# Patient Record
Sex: FEMALE | URBAN - METROPOLITAN AREA
[De-identification: names, ages, dates, MRNs, and addresses within clinical notes are randomized per-mention and may not be internally consistent; named-entity substitution may affect disease eponyms.]

---

## 2020-08-05 ENCOUNTER — WEB ENCOUNTER (OUTPATIENT)
Dept: URBAN - METROPOLITAN AREA CLINIC 23 | Facility: CLINIC | Age: 40
End: 2020-08-05

## 2020-08-07 ENCOUNTER — LAB OUTSIDE AN ENCOUNTER (OUTPATIENT)
Dept: URBAN - METROPOLITAN AREA CLINIC 23 | Facility: CLINIC | Age: 40
End: 2020-08-07

## 2020-08-18 ENCOUNTER — OFFICE VISIT (OUTPATIENT)
Dept: URBAN - METROPOLITAN AREA SURGERY CENTER 15 | Facility: SURGERY CENTER | Age: 40
End: 2020-08-18
Payer: COMMERCIAL

## 2020-08-18 ENCOUNTER — CLAIMS CREATED FROM THE CLAIM WINDOW (OUTPATIENT)
Dept: URBAN - METROPOLITAN AREA CLINIC 4 | Facility: CLINIC | Age: 40
End: 2020-08-18
Payer: COMMERCIAL

## 2020-08-18 DIAGNOSIS — K31.7 BENIGN GASTRIC POLYP: ICD-10-CM

## 2020-08-18 DIAGNOSIS — K31.89 OTHER DISEASES OF STOMACH AND DUODENUM: ICD-10-CM

## 2020-08-18 DIAGNOSIS — K21.0 GASTRO-ESOPHAGEAL REFLUX DISEASE WITH ESOPHAGITIS: ICD-10-CM

## 2020-08-18 DIAGNOSIS — K21.9 ACID REFLUX: ICD-10-CM

## 2020-08-18 DIAGNOSIS — K29.60 OTHER GASTRITIS WITHOUT BLEEDING: ICD-10-CM

## 2020-08-18 DIAGNOSIS — K29.60 ADENOPAPILLOMATOSIS GASTRICA: ICD-10-CM

## 2020-08-18 DIAGNOSIS — K31.7 POLYP OF STOMACH AND DUODENUM: ICD-10-CM

## 2020-08-18 PROCEDURE — 43239 EGD BIOPSY SINGLE/MULTIPLE: CPT | Performed by: INTERNAL MEDICINE

## 2020-08-18 PROCEDURE — 88341 IMHCHEM/IMCYTCHM EA ADD ANTB: CPT | Performed by: PATHOLOGY

## 2020-08-18 PROCEDURE — G8907 PT DOC NO EVENTS ON DISCHARG: HCPCS | Performed by: INTERNAL MEDICINE

## 2020-08-18 PROCEDURE — 88305 TISSUE EXAM BY PATHOLOGIST: CPT | Performed by: PATHOLOGY

## 2020-08-18 PROCEDURE — 88342 IMHCHEM/IMCYTCHM 1ST ANTB: CPT | Performed by: PATHOLOGY

## 2020-08-18 PROCEDURE — 88312 SPECIAL STAINS GROUP 1: CPT | Performed by: PATHOLOGY

## 2020-09-16 ENCOUNTER — TELEPHONE ENCOUNTER (OUTPATIENT)
Dept: URBAN - METROPOLITAN AREA CLINIC 23 | Facility: CLINIC | Age: 40
End: 2020-09-16

## 2020-09-17 ENCOUNTER — WEB ENCOUNTER (OUTPATIENT)
Dept: URBAN - METROPOLITAN AREA CLINIC 23 | Facility: CLINIC | Age: 40
End: 2020-09-17

## 2020-10-05 ENCOUNTER — TELEPHONE ENCOUNTER (OUTPATIENT)
Dept: URBAN - METROPOLITAN AREA CLINIC 23 | Facility: CLINIC | Age: 40
End: 2020-10-05

## 2020-10-06 ENCOUNTER — TELEPHONE ENCOUNTER (OUTPATIENT)
Dept: URBAN - METROPOLITAN AREA CLINIC 23 | Facility: CLINIC | Age: 40
End: 2020-10-06

## 2020-10-11 ENCOUNTER — TELEPHONE ENCOUNTER (OUTPATIENT)
Dept: URBAN - METROPOLITAN AREA CLINIC 92 | Facility: CLINIC | Age: 40
End: 2020-10-11

## 2020-10-11 RX ORDER — LANSOPRAZOLE 30 MG/1
1 TABLET TABLET, ORALLY DISINTEGRATING, DELAYED RELEASE ORAL ONCE A DAY
Qty: 90 | Refills: 0

## 2020-10-14 ENCOUNTER — LAB OUTSIDE AN ENCOUNTER (OUTPATIENT)
Dept: URBAN - METROPOLITAN AREA CLINIC 23 | Facility: CLINIC | Age: 40
End: 2020-10-14

## 2020-10-15 ENCOUNTER — TELEPHONE ENCOUNTER (OUTPATIENT)
Dept: URBAN - METROPOLITAN AREA CLINIC 92 | Facility: CLINIC | Age: 40
End: 2020-10-15

## 2020-11-12 ENCOUNTER — OFFICE VISIT (OUTPATIENT)
Dept: URBAN - METROPOLITAN AREA CLINIC 23 | Facility: CLINIC | Age: 40
End: 2020-11-12
Payer: COMMERCIAL

## 2020-11-12 VITALS
DIASTOLIC BLOOD PRESSURE: 66 MMHG | TEMPERATURE: 98.5 F | WEIGHT: 106 LBS | SYSTOLIC BLOOD PRESSURE: 96 MMHG | HEIGHT: 63 IN | BODY MASS INDEX: 18.78 KG/M2 | HEART RATE: 89 BPM

## 2020-11-12 DIAGNOSIS — K21.9 GERD: ICD-10-CM

## 2020-11-12 DIAGNOSIS — R10.13 EPIGASTRIC ABDOMINAL PAIN: ICD-10-CM

## 2020-11-12 DIAGNOSIS — D50.9 IRON DEFICIENCY ANEMIA: ICD-10-CM

## 2020-11-12 DIAGNOSIS — K29.70 GASTRITIS: ICD-10-CM

## 2020-11-12 PROCEDURE — G8427 DOCREV CUR MEDS BY ELIG CLIN: HCPCS | Performed by: INTERNAL MEDICINE

## 2020-11-12 PROCEDURE — G8420 CALC BMI NORM PARAMETERS: HCPCS | Performed by: INTERNAL MEDICINE

## 2020-11-12 PROCEDURE — 86677 HELICOBACTER PYLORI ANTIBODY: CPT | Performed by: INTERNAL MEDICINE

## 2020-11-12 PROCEDURE — 99212 OFFICE O/P EST SF 10 MIN: CPT | Performed by: INTERNAL MEDICINE

## 2020-11-12 PROCEDURE — 1036F TOBACCO NON-USER: CPT | Performed by: INTERNAL MEDICINE

## 2020-11-12 RX ORDER — LANSOPRAZOLE 30 MG/1
1 TABLET TABLET, ORALLY DISINTEGRATING, DELAYED RELEASE ORAL ONCE A DAY
Qty: 90 | Refills: 0 | Status: ACTIVE | COMMUNITY

## 2020-11-12 RX ORDER — SUCRALFATE 1 G/10ML
TAKE 10 MILLILITERS (1 GRAM) BY ORAL ROUTE 4 TIMES PER DAY ON AN EMPTY STOMACH 1 HOUR BEFORE MEALS AND AT BEDTIME SUSPENSION ORAL
Qty: 3600 | Refills: 0 | Status: ACTIVE | COMMUNITY
Start: 2020-05-15

## 2020-11-12 RX ORDER — IRON FUM,PS CMP/VIT C/NIACIN 125-40-3MG
TAKE 1 CAPSULE BY ORAL ROUTE ONCE DAILY CAPSULE ORAL 1
Qty: 0 | Refills: 0 | Status: ACTIVE | COMMUNITY
Start: 1900-01-01

## 2020-11-12 NOTE — HPI-TODAY'S VISIT:
39 yo female presenting for f/u for heartburn -she feels that things are better- she feels like the lansoprazole  has helped decrease the sx but are not gone at this point -she has been getting iron infusions for iron deficiency, found after the endoscopy. -she states that she stopped taking sucralfate because she couldn't take it with food

## 2020-11-14 ENCOUNTER — TELEPHONE ENCOUNTER (OUTPATIENT)
Dept: URBAN - METROPOLITAN AREA CLINIC 23 | Facility: CLINIC | Age: 40
End: 2020-11-14

## 2020-11-14 LAB
H PYLORI, IGM ABS: <9
H. PYLORI, IGA ABS: <9
H. PYLORI, IGG ABS: 0.34

## 2021-04-13 ENCOUNTER — TELEPHONE ENCOUNTER (OUTPATIENT)
Dept: URBAN - METROPOLITAN AREA CLINIC 49 | Facility: CLINIC | Age: 41
End: 2021-04-13

## 2021-04-13 RX ORDER — LANSOPRAZOLE 30 MG/1
1 TABLET TABLET, ORALLY DISINTEGRATING, DELAYED RELEASE ORAL ONCE A DAY
Qty: 90 | Refills: 0
Start: 2021-04-13

## 2021-05-28 ENCOUNTER — OFFICE VISIT (OUTPATIENT)
Dept: URBAN - METROPOLITAN AREA LAB 3 | Facility: LAB | Age: 41
End: 2021-05-28
Payer: COMMERCIAL

## 2021-05-28 DIAGNOSIS — K29.60 ADENOPAPILLOMATOSIS GASTRICA: ICD-10-CM

## 2021-05-28 PROCEDURE — 43239 EGD BIOPSY SINGLE/MULTIPLE: CPT | Performed by: INTERNAL MEDICINE

## 2021-05-28 RX ORDER — SUCRALFATE 1 G/10ML
TAKE 10 MILLILITERS (1 GRAM) BY ORAL ROUTE 4 TIMES PER DAY ON AN EMPTY STOMACH 1 HOUR BEFORE MEALS AND AT BEDTIME SUSPENSION ORAL
Qty: 3600 | Refills: 0 | Status: ACTIVE | COMMUNITY
Start: 2020-05-15

## 2021-05-28 RX ORDER — LANSOPRAZOLE 30 MG/1
1 TABLET TABLET, ORALLY DISINTEGRATING, DELAYED RELEASE ORAL ONCE A DAY
Qty: 90 | Refills: 0 | Status: ACTIVE | COMMUNITY
Start: 2021-04-13

## 2021-05-28 RX ORDER — IRON FUM,PS CMP/VIT C/NIACIN 125-40-3MG
TAKE 1 CAPSULE BY ORAL ROUTE ONCE DAILY CAPSULE ORAL 1
Qty: 0 | Refills: 0 | Status: ACTIVE | COMMUNITY
Start: 1900-01-01

## 2021-06-15 ENCOUNTER — TELEPHONE ENCOUNTER (OUTPATIENT)
Dept: URBAN - METROPOLITAN AREA CLINIC 49 | Facility: CLINIC | Age: 41
End: 2021-06-15

## 2021-08-11 ENCOUNTER — OFFICE VISIT (OUTPATIENT)
Dept: URBAN - METROPOLITAN AREA CLINIC 12 | Facility: CLINIC | Age: 41
End: 2021-08-11
Payer: COMMERCIAL

## 2021-08-11 DIAGNOSIS — K31.7 GASTRIC POLYP: ICD-10-CM

## 2021-08-11 DIAGNOSIS — K21.9 GERD: ICD-10-CM

## 2021-08-11 DIAGNOSIS — K29.70 GASTRITIS: ICD-10-CM

## 2021-08-11 DIAGNOSIS — R10.13 EPIGASTRIC ABDOMINAL PAIN: ICD-10-CM

## 2021-08-11 DIAGNOSIS — D50.9 IRON DEFICIENCY ANEMIA: ICD-10-CM

## 2021-08-11 PROCEDURE — 99213 OFFICE O/P EST LOW 20 MIN: CPT | Performed by: INTERNAL MEDICINE

## 2021-08-11 RX ORDER — SUCRALFATE 1 G/10ML
TAKE 10 MILLILITERS (1 GRAM) BY ORAL ROUTE 4 TIMES PER DAY ON AN EMPTY STOMACH 1 HOUR BEFORE MEALS AND AT BEDTIME SUSPENSION ORAL
Qty: 3600 | Refills: 0 | Status: DISCONTINUED | COMMUNITY
Start: 2020-05-15

## 2021-08-11 RX ORDER — LANSOPRAZOLE 30 MG/1
1 TABLET TABLET, ORALLY DISINTEGRATING, DELAYED RELEASE ORAL ONCE A DAY
Qty: 90 | Refills: 1
Start: 2021-04-13

## 2021-08-11 RX ORDER — IRON FUM,PS CMP/VIT C/NIACIN 125-40-3MG
TAKE 1 CAPSULE BY ORAL ROUTE ONCE DAILY CAPSULE ORAL 1
Qty: 0 | Refills: 0 | Status: DISCONTINUED | COMMUNITY
Start: 1900-01-01

## 2021-08-11 RX ORDER — LANSOPRAZOLE 30 MG/1
1 TABLET TABLET, ORALLY DISINTEGRATING, DELAYED RELEASE ORAL ONCE A DAY
Qty: 90 | Refills: 0 | Status: ACTIVE | COMMUNITY
Start: 2021-04-13

## 2021-08-11 NOTE — HPI-TODAY'S VISIT:
41 yo female presenting for f/u for heartburn -she feels that things are better- she feels like the lansoprazole  has helped decrease the sx but are not gone at this point -she has been getting iron infusions for iron deficiency, found after the endoscopy. -she states that she stopped taking sucralfate because she couldn't take it with food ========================================================================== 8/11/2020 she is doing lansoprazole 30 mg daily- when she is taking this daily this controls the symptoms , makes it very minimal.  when she doesn't take the medication she has burning sensation and discomfort extending to the back.  she feels that her appetite is the same- eats about 2 meals .  she feels that her weight has been stable.   -she has been getting iron infusion which has helped her feel better and her weight has increased   -she is seeing [facility] for her low iron .  states that her menustral cycles are normal -she has had iron deficiency since her teen years and has been on oral iron supplements due to this.  had to switch to iv iron because of intolerance to po iron.   -she does not have any blood in the stool at all denies any nausea, vomiting at all -she did try prevacid otc but didn't feel that there is any benefit to it

## 2022-02-09 ENCOUNTER — OFFICE VISIT (OUTPATIENT)
Dept: URBAN - METROPOLITAN AREA CLINIC 12 | Facility: CLINIC | Age: 42
End: 2022-02-09

## 2022-02-16 ENCOUNTER — OFFICE VISIT (OUTPATIENT)
Dept: URBAN - METROPOLITAN AREA CLINIC 12 | Facility: CLINIC | Age: 42
End: 2022-02-16

## 2022-04-06 ENCOUNTER — OFFICE VISIT (OUTPATIENT)
Dept: URBAN - METROPOLITAN AREA CLINIC 12 | Facility: CLINIC | Age: 42
End: 2022-04-06

## 2022-05-11 ENCOUNTER — OFFICE VISIT (OUTPATIENT)
Dept: URBAN - METROPOLITAN AREA CLINIC 12 | Facility: CLINIC | Age: 42
End: 2022-05-11
Payer: COMMERCIAL

## 2022-05-11 VITALS
HEART RATE: 94 BPM | TEMPERATURE: 98 F | BODY MASS INDEX: 20.02 KG/M2 | SYSTOLIC BLOOD PRESSURE: 108 MMHG | HEIGHT: 63 IN | WEIGHT: 113 LBS | DIASTOLIC BLOOD PRESSURE: 74 MMHG

## 2022-05-11 DIAGNOSIS — K21.9 GERD: ICD-10-CM

## 2022-05-11 DIAGNOSIS — K29.70 GASTRITIS: ICD-10-CM

## 2022-05-11 DIAGNOSIS — D50.9 IRON DEFICIENCY ANEMIA: ICD-10-CM

## 2022-05-11 DIAGNOSIS — K31.7 GASTRIC POLYP: ICD-10-CM

## 2022-05-11 DIAGNOSIS — R10.13 EPIGASTRIC ABDOMINAL PAIN: ICD-10-CM

## 2022-05-11 DIAGNOSIS — K59.01 CONSTIPATION: ICD-10-CM

## 2022-05-11 PROCEDURE — 99214 OFFICE O/P EST MOD 30 MIN: CPT | Performed by: INTERNAL MEDICINE

## 2022-05-11 RX ORDER — LANSOPRAZOLE 30 MG/1
1 TABLET TABLET, ORALLY DISINTEGRATING, DELAYED RELEASE ORAL ONCE A DAY
Qty: 90 | Refills: 1 | Status: ACTIVE | COMMUNITY
Start: 2021-04-13

## 2022-05-11 RX ORDER — LANSOPRAZOLE 30 MG/1
1 TABLET TABLET, ORALLY DISINTEGRATING, DELAYED RELEASE ORAL ONCE A DAY
Qty: 90 | Refills: 1

## 2022-05-11 NOTE — HPI-TODAY'S VISIT:
5/11/2022 she feels that of all of the ppi she has tried lansoprazole is working better for her. she is using only 3-4 times a week not daily.  has more sensitivity in the chest and upper intestine to specific foods -she has started being able3 to lie down flat while sleeping, states that this is new for her and better -she is complainins of issues with constipation as well -feels that her hemorrhoids are increasing- they are not bleeding. she feels that they are irritated at times.  she feels that constipation plays a part. she has had constiation beforehowever this is new and worse for her.  when enver she goes to the bathroom the stool is hard.  hard to empty.  she is drinking about 4-5 glasses a day.   -she is taking full time school now , sitting for long periods of time -she is not using any fiber supplements or laxatives -denies any abdominal pain -she has no blood in the stool -she is seeing her heme for the longstanding iron issues

## 2022-05-16 ENCOUNTER — TELEPHONE ENCOUNTER (OUTPATIENT)
Dept: URBAN - METROPOLITAN AREA CLINIC 49 | Facility: CLINIC | Age: 42
End: 2022-05-16

## 2022-05-17 ENCOUNTER — CLAIMS CREATED FROM THE CLAIM WINDOW (OUTPATIENT)
Dept: URBAN - METROPOLITAN AREA SURGERY CENTER 15 | Facility: SURGERY CENTER | Age: 42
End: 2022-05-17

## 2022-05-17 ENCOUNTER — CLAIMS CREATED FROM THE CLAIM WINDOW (OUTPATIENT)
Dept: URBAN - METROPOLITAN AREA CLINIC 4 | Facility: CLINIC | Age: 42
End: 2022-05-17
Payer: COMMERCIAL

## 2022-05-17 ENCOUNTER — CLAIMS CREATED FROM THE CLAIM WINDOW (OUTPATIENT)
Dept: URBAN - METROPOLITAN AREA SURGERY CENTER 15 | Facility: SURGERY CENTER | Age: 42
End: 2022-05-17
Payer: COMMERCIAL

## 2022-05-17 DIAGNOSIS — K31.89 ACQUIRED DEFORMITY OF DUODENUM: ICD-10-CM

## 2022-05-17 DIAGNOSIS — D12.2 ADENOMA OF ASCENDING COLON: ICD-10-CM

## 2022-05-17 DIAGNOSIS — K29.60 ADENOPAPILLOMATOSIS GASTRICA: ICD-10-CM

## 2022-05-17 DIAGNOSIS — K31.89 FOCAL FOVEOLAR HYPERPLASIA: ICD-10-CM

## 2022-05-17 DIAGNOSIS — K29.70 GASTRITIS, UNSPECIFIED, WITHOUT BLEEDING: ICD-10-CM

## 2022-05-17 DIAGNOSIS — K31.7 POLYP OF STOMACH AND DUODENUM: ICD-10-CM

## 2022-05-17 DIAGNOSIS — K31.7 BENIGN GASTRIC POLYP: ICD-10-CM

## 2022-05-17 DIAGNOSIS — K31.A0 GASTRIC INTESTINAL METAPLASIA, UNSPECIFIED: ICD-10-CM

## 2022-05-17 DIAGNOSIS — K59.09 CHANGE IN BOWEL MOVEMENTS INTERMITTENT CONSTIPATION. URGENCY IN THE MORNING.: ICD-10-CM

## 2022-05-17 DIAGNOSIS — D12.2 BENIGN NEOPLASM OF ASCENDING COLON: ICD-10-CM

## 2022-05-17 PROCEDURE — G8907 PT DOC NO EVENTS ON DISCHARG: HCPCS | Performed by: INTERNAL MEDICINE

## 2022-05-17 PROCEDURE — 88305 TISSUE EXAM BY PATHOLOGIST: CPT | Performed by: PATHOLOGY

## 2022-05-17 PROCEDURE — 43239 EGD BIOPSY SINGLE/MULTIPLE: CPT | Performed by: INTERNAL MEDICINE

## 2022-05-17 PROCEDURE — 88342 IMHCHEM/IMCYTCHM 1ST ANTB: CPT | Performed by: PATHOLOGY

## 2022-05-17 PROCEDURE — 45380 COLONOSCOPY AND BIOPSY: CPT | Performed by: INTERNAL MEDICINE

## 2022-05-17 PROCEDURE — 45385 COLONOSCOPY W/LESION REMOVAL: CPT | Performed by: INTERNAL MEDICINE

## 2022-05-17 PROCEDURE — 88341 IMHCHEM/IMCYTCHM EA ADD ANTB: CPT | Performed by: PATHOLOGY

## 2022-06-21 ENCOUNTER — TELEPHONE ENCOUNTER (OUTPATIENT)
Dept: URBAN - METROPOLITAN AREA CLINIC 96 | Facility: CLINIC | Age: 42
End: 2022-06-21

## 2022-06-24 ENCOUNTER — TELEPHONE ENCOUNTER (OUTPATIENT)
Dept: URBAN - METROPOLITAN AREA CLINIC 92 | Facility: CLINIC | Age: 42
End: 2022-06-24

## 2022-08-03 ENCOUNTER — OFFICE VISIT (OUTPATIENT)
Dept: URBAN - METROPOLITAN AREA CLINIC 12 | Facility: CLINIC | Age: 42
End: 2022-08-03
Payer: COMMERCIAL

## 2022-08-03 ENCOUNTER — WEB ENCOUNTER (OUTPATIENT)
Dept: URBAN - METROPOLITAN AREA CLINIC 12 | Facility: CLINIC | Age: 42
End: 2022-08-03

## 2022-08-03 VITALS
TEMPERATURE: 97.3 F | SYSTOLIC BLOOD PRESSURE: 104 MMHG | HEART RATE: 70 BPM | WEIGHT: 113 LBS | HEIGHT: 63 IN | DIASTOLIC BLOOD PRESSURE: 64 MMHG | BODY MASS INDEX: 20.02 KG/M2

## 2022-08-03 DIAGNOSIS — D50.8 ACQUIRED IRON DEFICIENCY ANEMIA DUE TO DECREASED ABSORPTION: ICD-10-CM

## 2022-08-03 DIAGNOSIS — K31.7 GASTRIC POLYP: ICD-10-CM

## 2022-08-03 DIAGNOSIS — K21.9 GERD: ICD-10-CM

## 2022-08-03 DIAGNOSIS — R10.13 EPIGASTRIC ABDOMINAL PAIN: ICD-10-CM

## 2022-08-03 DIAGNOSIS — K29.60 ADENOPAPILLOMATOSIS GASTRICA: ICD-10-CM

## 2022-08-03 DIAGNOSIS — Z86.010 PERSONAL HISTORY OF COLONIC POLYPS: ICD-10-CM

## 2022-08-03 DIAGNOSIS — K59.01 CONSTIPATION: ICD-10-CM

## 2022-08-03 PROBLEM — 235595009 GASTROESOPHAGEAL REFLUX DISEASE: Status: ACTIVE | Noted: 2020-09-28

## 2022-08-03 PROBLEM — 79922009 EPIGASTRIC PAIN: Status: ACTIVE | Noted: 2020-11-12

## 2022-08-03 PROBLEM — 87522002 IRON DEFICIENCY ANEMIA: Status: ACTIVE | Noted: 2020-11-12

## 2022-08-03 PROBLEM — 4556007 GASTRITIS: Status: ACTIVE | Noted: 2020-11-12

## 2022-08-03 PROBLEM — 428283002: Status: ACTIVE | Noted: 2022-08-03

## 2022-08-03 PROBLEM — 78809005: Status: ACTIVE | Noted: 2021-04-13

## 2022-08-03 PROBLEM — 14760008 CONSTIPATION: Status: ACTIVE | Noted: 2022-05-11

## 2022-08-03 PROCEDURE — 99213 OFFICE O/P EST LOW 20 MIN: CPT | Performed by: INTERNAL MEDICINE

## 2022-08-03 PROCEDURE — G9903 PT SCRN TBCO ID AS NON USER: HCPCS | Performed by: INTERNAL MEDICINE

## 2022-08-03 PROCEDURE — 1036F TOBACCO NON-USER: CPT | Performed by: INTERNAL MEDICINE

## 2022-08-03 PROCEDURE — 3017F COLORECTAL CA SCREEN DOC REV: CPT | Performed by: INTERNAL MEDICINE

## 2022-08-03 PROCEDURE — G8427 DOCREV CUR MEDS BY ELIG CLIN: HCPCS | Performed by: INTERNAL MEDICINE

## 2022-08-03 PROCEDURE — G9622 NO UNHEAL ETOH USER: HCPCS | Performed by: INTERNAL MEDICINE

## 2022-08-03 PROCEDURE — G8420 CALC BMI NORM PARAMETERS: HCPCS | Performed by: INTERNAL MEDICINE

## 2022-08-03 RX ORDER — LANSOPRAZOLE 30 MG/1
1 TABLET TABLET, ORALLY DISINTEGRATING, DELAYED RELEASE ORAL ONCE A DAY
Qty: 90 | Refills: 1 | Status: ACTIVE | COMMUNITY

## 2022-08-03 NOTE — PREVIOUS WORKUP REVIEWED
. ENDOSCOPIES 2022- colonoscopy- TA  and benign tissue 2022- egd- HP polyp removed, gastritis with ? h pylori 2021- egd- no poylps, negative h pylori 2020- egd hyperplastic polyp LABS  IMAGES

## 2022-08-03 NOTE — HPI-TODAY'S VISIT:
5/11/2022 she feels that of all of the ppi she has tried lansoprazole is working better for her. she is using only 3-4 times a week not daily.  has more sensitivity in the chest and upper intestine to specific foods -she has started being able3 to lie down flat while sleeping, states that this is new for her and better -she is complainins of issues with constipation as well -feels that her hemorrhoids are increasing- they are not bleeding. she feels that they are irritated at times.  she feels that constipation plays a part. she has had constiation beforehowever this is new and worse for her.  when enver she goes to the bathroom the stool is hard.  hard to empty.  she is drinking about 4-5 glasses a day.   -she is taking full time school now , sitting for long periods of time -she is not using any fiber supplements or laxatives -denies any abdominal pain -she has no blood in the stool -she is seeing her heme for the longstanding iron issues =========================================================================== 8/3/2022 she has been off from school and feels that this has made a difference she does still feel some soreness, not bothersome -she states that she has some heartburn at this time but not as bad she is off her ppi altogether -she is still following with heme for her iron -she is doing classes for respiratory therapy- doing her classes and now will do clinicals Rifampin Pregnancy And Lactation Text: This medication is Pregnancy Category C and it isn't know if it is safe during pregnancy. It is also excreted in breast milk and should not be used if you are breast feeding.

## 2022-08-10 LAB — H PYLORI BREATH TEST: NEGATIVE

## 2023-02-02 ENCOUNTER — OFFICE VISIT (OUTPATIENT)
Dept: URBAN - METROPOLITAN AREA CLINIC 23 | Facility: CLINIC | Age: 43
End: 2023-02-02

## 2023-03-29 ENCOUNTER — OFFICE VISIT (OUTPATIENT)
Dept: URBAN - METROPOLITAN AREA CLINIC 12 | Facility: CLINIC | Age: 43
End: 2023-03-29
Payer: COMMERCIAL

## 2023-03-29 VITALS — HEIGHT: 63 IN

## 2023-03-29 DIAGNOSIS — K29.70 GASTRITIS: ICD-10-CM

## 2023-03-29 DIAGNOSIS — Z86.010 PERSONAL HISTORY OF COLONIC POLYPS: ICD-10-CM

## 2023-03-29 DIAGNOSIS — D50.9 IRON DEFICIENCY ANEMIA: ICD-10-CM

## 2023-03-29 DIAGNOSIS — K31.7 GASTRIC POLYP: ICD-10-CM

## 2023-03-29 DIAGNOSIS — R10.13 EPIGASTRIC ABDOMINAL PAIN: ICD-10-CM

## 2023-03-29 DIAGNOSIS — K21.9 GERD: ICD-10-CM

## 2023-03-29 DIAGNOSIS — K59.01 CONSTIPATION: ICD-10-CM

## 2023-03-29 DIAGNOSIS — R10.13 DYSPEPSIA: ICD-10-CM

## 2023-03-29 PROCEDURE — 99214 OFFICE O/P EST MOD 30 MIN: CPT | Performed by: INTERNAL MEDICINE

## 2023-03-29 RX ORDER — LANSOPRAZOLE 30 MG/1
1 TABLET TABLET, ORALLY DISINTEGRATING, DELAYED RELEASE ORAL ONCE A DAY
Qty: 90 | Refills: 1 | Status: DISCONTINUED | COMMUNITY

## 2023-03-29 NOTE — HPI-TODAY'S VISIT:
5/11/2022 she feels that of all of the ppi she has tried lansoprazole is working better for her. she is using only 3-4 times a week not daily.  has more sensitivity in the chest and upper intestine to specific foods -she has started being able3 to lie down flat while sleeping, states that this is new for her and better -she is complainins of issues with constipation as well -feels that her hemorrhoids are increasing- they are not bleeding. she feels that they are irritated at times.  she feels that constipation plays a part. she has had constiation beforehowever this is new and worse for her.  when enver she goes to the bathroom the stool is hard.  hard to empty.  she is drinking about 4-5 glasses a day.   -she is taking full time school now , sitting for long periods of time -she is not using any fiber supplements or laxatives -denies any abdominal pain -she has no blood in the stool -she is seeing her heme for the longstanding iron issues =========================================================================== 8/3/2022 she has been off from school and feels that this has made a difference she does still feel some soreness, not bothersome -she states that she has some heartburn at this time but not as bad she is off her ppi altogether -she is still following with heme for her iron -she is doing classes for respiratory therapy- doing her classes and now will do clinicals ================================================================== 3/29/2023 she states that she has come off of the lansoprazole , she is only doing pepcid at this time prn -she follows her sx - when she starts to feel heartburn she takes the medication - she will take at last 5 days.  she usually takes it in the mid day when she starts to feel burning .  it is managable after she takes it and it doesn't bother her. if she doesn't take it in a few hours she gets burping and nausea, vomiting -she is taking otc pepcid 10 mg one tablet -she is finished with her respiratory therapy - and is starting at [facility] in May -bowe movement have been ok -she does have the iron deficiency and is following with heme, she is taking iron tablets - stopped taking them due to constipation. she has a long history of the DELMI -she feels that her diet is simple at times she has breaad and milk some days.  she is able to eat more substantial food on other days.  the amount of food is the same but has to do simple food -her weight is consistant

## 2023-09-27 ENCOUNTER — LAB OUTSIDE AN ENCOUNTER (OUTPATIENT)
Dept: URBAN - METROPOLITAN AREA CLINIC 12 | Facility: CLINIC | Age: 43
End: 2023-09-27

## 2023-09-27 ENCOUNTER — OFFICE VISIT (OUTPATIENT)
Dept: URBAN - METROPOLITAN AREA CLINIC 12 | Facility: CLINIC | Age: 43
End: 2023-09-27
Payer: COMMERCIAL

## 2023-09-27 VITALS
SYSTOLIC BLOOD PRESSURE: 105 MMHG | HEART RATE: 104 BPM | HEIGHT: 63 IN | TEMPERATURE: 97.8 F | DIASTOLIC BLOOD PRESSURE: 73 MMHG | WEIGHT: 115.8 LBS | BODY MASS INDEX: 20.52 KG/M2

## 2023-09-27 DIAGNOSIS — R10.13 DYSPEPSIA: ICD-10-CM

## 2023-09-27 DIAGNOSIS — D50.9 IRON DEFICIENCY ANEMIA: ICD-10-CM

## 2023-09-27 DIAGNOSIS — K29.70 GASTRITIS: ICD-10-CM

## 2023-09-27 DIAGNOSIS — Z86.010 PERSONAL HISTORY OF COLONIC POLYPS: ICD-10-CM

## 2023-09-27 DIAGNOSIS — K31.7 GASTRIC POLYP: ICD-10-CM

## 2023-09-27 DIAGNOSIS — K21.9 GERD: ICD-10-CM

## 2023-09-27 DIAGNOSIS — K59.01 CONSTIPATION: ICD-10-CM

## 2023-09-27 PROCEDURE — 99213 OFFICE O/P EST LOW 20 MIN: CPT | Performed by: INTERNAL MEDICINE

## 2023-09-27 NOTE — HPI-GERD
reports experiencing workplace burnout, which has been significantly impacting her health. She has been self-medicating with Pepsid, 10 milligrams daily, but has recently had to increase the dosage to twice daily and supplement with Lansoprazole due to the ineffectiveness of Pepsid. This regimen has been maintained two to three times weekly for the past two months to manage her energy levels. She identifies stress as a major trigger for her symptoms.

## 2023-10-10 ENCOUNTER — OFFICE VISIT (OUTPATIENT)
Dept: URBAN - METROPOLITAN AREA SURGERY CENTER 15 | Facility: SURGERY CENTER | Age: 43
End: 2023-10-10
Payer: COMMERCIAL

## 2023-10-10 DIAGNOSIS — K29.70 GASTRITIS, UNSPECIFIED, WITHOUT BLEEDING: ICD-10-CM

## 2023-10-10 DIAGNOSIS — K29.60 ADENOPAPILLOMATOSIS GASTRICA: ICD-10-CM

## 2023-10-10 DIAGNOSIS — Z87.19 HISTORY OF GASTRIC POLYP: ICD-10-CM

## 2023-10-10 PROCEDURE — 43239 EGD BIOPSY SINGLE/MULTIPLE: CPT | Performed by: INTERNAL MEDICINE

## 2023-10-10 PROCEDURE — 00731 ANES UPR GI NDSC PX NOS: CPT | Performed by: NURSE ANESTHETIST, CERTIFIED REGISTERED

## 2023-10-10 PROCEDURE — G8907 PT DOC NO EVENTS ON DISCHARG: HCPCS | Performed by: INTERNAL MEDICINE

## 2024-03-13 ENCOUNTER — OV EP (OUTPATIENT)
Dept: URBAN - METROPOLITAN AREA CLINIC 111 | Facility: CLINIC | Age: 44
End: 2024-03-13
Payer: COMMERCIAL

## 2024-03-13 VITALS
DIASTOLIC BLOOD PRESSURE: 67 MMHG | HEIGHT: 63 IN | BODY MASS INDEX: 20.3 KG/M2 | TEMPERATURE: 99.1 F | HEART RATE: 81 BPM | SYSTOLIC BLOOD PRESSURE: 96 MMHG | WEIGHT: 114.6 LBS

## 2024-03-13 DIAGNOSIS — K64.9 BLEEDING HEMORRHOIDS: ICD-10-CM

## 2024-03-13 DIAGNOSIS — Z86.010 HISTORY OF ADENOMATOUS POLYP OF COLON: ICD-10-CM

## 2024-03-13 PROBLEM — 429047008: Status: ACTIVE | Noted: 2024-03-13

## 2024-03-13 PROCEDURE — 99213 OFFICE O/P EST LOW 20 MIN: CPT | Performed by: PHYSICIAN ASSISTANT

## 2024-03-13 NOTE — HPI-TODAY'S VISIT:
43 y/o female here with hemorrhoids. Last seen in 9/2023 by Dr. Banegas for GERD and h/o gastric polyp (hyperplastic in 2020). She was taking Pepcid and Lansoprazole as needed. She had EGD in 10/2023 revealing gastritis. Path unimpressive. S/p colonoscopy in 2022 with tubular adenoma and due for repeat in 2025.  She had hemorrhoids when she was pregnant. She has 4 children. She has been having itching and feeling sore in her rectum for the last 10-14 days. She reports hemorrhoids go back in after BM. She reports pain now with having a stool. She takes iron supplement a few times a week. Seeing red blood on toilet paper. She also has vaginal itching and seeing OB today as well. She did use preparation H which made things worse. She has used coconut oil which has been a little better.  No FH of GI cancer that she knows of.

## 2024-03-13 NOTE — PHYSICAL EXAM GASTROINTESTINAL
Abdomen, soft, nontender, nondistended, no guarding or rigidity, no masses palpable, normal bowel sounds, Liver and Spleen, no hepatosplenomegaly, Rectal, normal sphincter tone, external hemorrhoids noted, no rectal masses or bleeding present, a chaperone was present during the rectal examination

## 2024-05-30 ENCOUNTER — OFFICE VISIT (OUTPATIENT)
Dept: URBAN - METROPOLITAN AREA CLINIC 111 | Facility: CLINIC | Age: 44
End: 2024-05-30

## 2024-06-12 ENCOUNTER — OFFICE VISIT (OUTPATIENT)
Dept: URBAN - METROPOLITAN AREA CLINIC 12 | Facility: CLINIC | Age: 44
End: 2024-06-12
Payer: COMMERCIAL

## 2024-06-12 ENCOUNTER — DASHBOARD ENCOUNTERS (OUTPATIENT)
Age: 44
End: 2024-06-12

## 2024-06-12 VITALS
WEIGHT: 115 LBS | TEMPERATURE: 98.1 F | SYSTOLIC BLOOD PRESSURE: 102 MMHG | DIASTOLIC BLOOD PRESSURE: 70 MMHG | BODY MASS INDEX: 20.38 KG/M2 | HEART RATE: 81 BPM | HEIGHT: 63 IN

## 2024-06-12 DIAGNOSIS — D50.9 IRON DEFICIENCY ANEMIA: ICD-10-CM

## 2024-06-12 DIAGNOSIS — K29.70 GASTRITIS: ICD-10-CM

## 2024-06-12 DIAGNOSIS — K21.9 GERD: ICD-10-CM

## 2024-06-12 DIAGNOSIS — K59.01 CONSTIPATION: ICD-10-CM

## 2024-06-12 DIAGNOSIS — Z86.010 PERSONAL HISTORY OF COLONIC POLYPS: ICD-10-CM

## 2024-06-12 DIAGNOSIS — K64.9 BLEEDING HEMORRHOIDS: ICD-10-CM

## 2024-06-12 DIAGNOSIS — R10.13 DYSPEPSIA: ICD-10-CM

## 2024-06-12 DIAGNOSIS — K31.7 GASTRIC POLYP: ICD-10-CM

## 2024-06-12 PROCEDURE — 99214 OFFICE O/P EST MOD 30 MIN: CPT | Performed by: INTERNAL MEDICINE

## 2024-06-12 RX ORDER — LANSOPRAZOLE 30 MG/1
1 TABLET TABLET, ORALLY DISINTEGRATING, DELAYED RELEASE ORAL ONCE A DAY
Qty: 90 | Refills: 1

## 2024-06-12 NOTE — HPI-TODAY'S VISIT:
Ms. Diggs presents with a history of itching and burning in the hemorrhoids area, along with bleeding during bowel movements. The patient initially used an over-the-counter treatment, which seemed to aggravate the symptoms, causing increased swelling and itching. The compound medicine provided relief for the pain but was not effective in stopping the bleeding.  In the past week, Ms. Diggs also experienced vaginal issues and was diagnosed with a yeast infection by her OB. She had severe allergic reactions to two vaginal creams and was subsequently treated with boric acid, metronidazole, and an antibiotic. However, the yeast infection persisted, and she was given nystatin cream, which proved effective. The patient also reports that the nystatin cream helped alleviate her hemorrhoid symptoms.  Ms. Diggs has been taking iron tablets, probiotics, lansoprazole, pantoprazole, and Pepcid to manage her stomach acidity. She still experiences heartburn and occasionally takes lansoprazole for pain relief. The patient's iron levels have not improved but have not worsened either. Her last colonoscopy in 2022 showed no abnormalities.  During bowel movements, Ms. Diggs reports seeing blood dripping and the water turning red. She has made dietary changes, including increased fluid intake, salads, yogurt, and probiotics, which have improved her bowel movements and reduced constipation. The patient has also switched to a different iron supplement to help with constipation.

## 2024-06-12 NOTE — PREVIOUS WORKUP REVIEWED EXTERNAL MEDICAL RECORD
. ENDOSCOPIES egd 10/2023- normal endoscopy, no polyp seen.  biopsies with no h pylori. duodenal biopsies normal 2022- colonoscopy- TA  and benign tissue 2022- egd- HP polyp removed, gastritis with ? h pylori 2021- egd- no poylps, negative h pylori 2020- egd hyperplastic polyp LABS  IMAGES

## 2024-06-12 NOTE — EXAM-PHYSICAL EXAM
extrnal exam, small external hemorrhoids -digital rectal no pain -no palpable lesion anosopy grade 1 internal hemorrhoisd nonbleeding

## 2024-06-26 ENCOUNTER — OFFICE VISIT (OUTPATIENT)
Dept: URBAN - METROPOLITAN AREA CLINIC 12 | Facility: CLINIC | Age: 44
End: 2024-06-26

## 2025-01-03 ENCOUNTER — OFFICE VISIT (OUTPATIENT)
Dept: URBAN - METROPOLITAN AREA CLINIC 23 | Facility: CLINIC | Age: 45
End: 2025-01-03
Payer: COMMERCIAL

## 2025-01-03 ENCOUNTER — LAB OUTSIDE AN ENCOUNTER (OUTPATIENT)
Dept: URBAN - METROPOLITAN AREA CLINIC 23 | Facility: CLINIC | Age: 45
End: 2025-01-03

## 2025-01-03 VITALS
SYSTOLIC BLOOD PRESSURE: 105 MMHG | HEIGHT: 63 IN | HEART RATE: 84 BPM | DIASTOLIC BLOOD PRESSURE: 72 MMHG | BODY MASS INDEX: 20.38 KG/M2 | WEIGHT: 115 LBS

## 2025-01-03 DIAGNOSIS — R10.84 GENERALIZED ABDOMINAL PAIN: ICD-10-CM

## 2025-01-03 PROCEDURE — 99214 OFFICE O/P EST MOD 30 MIN: CPT

## 2025-01-03 RX ORDER — DICYCLOMINE HYDROCHLORIDE 20 MG/1
1 TABLET TABLET ORAL THREE TIMES A DAY
Qty: 90 | Refills: 3 | OUTPATIENT
Start: 2025-01-03 | End: 2025-05-03

## 2025-01-03 RX ORDER — LANSOPRAZOLE 30 MG/1
1 TABLET TABLET, ORALLY DISINTEGRATING, DELAYED RELEASE ORAL ONCE A DAY
Qty: 90 | Refills: 1 | Status: ACTIVE | COMMUNITY

## 2025-01-03 NOTE — HPI-TODAY'S VISIT:
44-year-old female here for abdominal pain and discomfort.   She was last seen in clinic June 2024 by Dr. Banegas due to acid reflux controlled with occasional lansoprazole and pepcid, stable DELMI, BRBPR. Started patient on hydrocortisone suppositories. If bleeding reoccurs recommend CRS referral.   Today, she complains of generalized abd pain with urge to defecate with associated bloating and gas for the past 3 days. 1 BM daily. [place] 4.  Her child was diagnosed with c.diff and treated recently. Pain stops her from what she is doing. Pain was worse after eating bread last night. No fever. Pain slightly better with laying down. No new medications or diet.  Acid reflux still controlled on prevacid and pepcid PRN.  EGD 10/2023- normal. bx negative Colonoscopy 2022- unremarkable. Repeat due 2025.

## 2025-01-09 NOTE — CQM EXCEPTIONS BMI FOLLOW-UP NOT DOCUMENTED
Cortisone Injection    You have received a cortisone injection. The medication is a combination of a short acting anesthetic (numbing medication)  plus a steroid.     You may see some relief from the pain for several hours following the injection due to the numbing medication. Later that day or the next day you may have the same pain you had before or an increase in soreness. Swelling can also occur.  You may try a cold compress for 20 minutes on and 20 minutes off that day or over the counter medications with food (if not allergic)    Our expectation would be that you will improve on a day by day basis for the next several weeks or even longer.     Cortisone may cause a temporary (usually 2-3 days) increase in blood glucose (sugar) levels. If you have diabetes, please pay particular attention to this.     Please call the office if there are no signs of improvement after 4 weeks or if other problems develop such as an increase in swelling or redness. Our office number is [phone]    Thank you for allowing us to be of service to you.     [facility].       Reason:: Patient not eligible

## 2025-01-15 ENCOUNTER — TELEPHONE ENCOUNTER (OUTPATIENT)
Dept: URBAN - METROPOLITAN AREA CLINIC 23 | Facility: CLINIC | Age: 45
End: 2025-01-15

## 2025-01-16 ENCOUNTER — WEB ENCOUNTER (OUTPATIENT)
Dept: URBAN - METROPOLITAN AREA CLINIC 23 | Facility: CLINIC | Age: 45
End: 2025-01-16

## 2025-01-24 ENCOUNTER — TELEPHONE ENCOUNTER (OUTPATIENT)
Dept: URBAN - METROPOLITAN AREA CLINIC 23 | Facility: CLINIC | Age: 45
End: 2025-01-24

## 2025-01-24 ENCOUNTER — LAB OUTSIDE AN ENCOUNTER (OUTPATIENT)
Dept: URBAN - METROPOLITAN AREA CLINIC 23 | Facility: CLINIC | Age: 45
End: 2025-01-24

## 2025-01-28 LAB
ADENOVIRUS F 40/41: NOT DETECTED
C. DIFFICILE TOXIN A/B, STOOL - QDX: NEGATIVE
CALPROTECTIN, STOOL - QDX: (no result)
CAMPYLOBACTER: NOT DETECTED
CLOSTRIDIUM DIFFICILE: NOT DETECTED
ENTAMOEBA HISTOLYTICA: NOT DETECTED
ENTEROAGGREGATIVE E.COLI: NOT DETECTED
ENTEROTOXIGENIC E.COLI: NOT DETECTED
ESCHERICHIA COLI O157: NOT DETECTED
GIARDIA LAMBLIA: NOT DETECTED
NOROVIRUS GI/GII: NOT DETECTED
ROTAVIRUS A: NOT DETECTED
SALMONELLA SPP.: NOT DETECTED
SHIGA-LIKE TOXIN PRODUCING E.COLI: NOT DETECTED
SHIGELLA SPP. / ENTEROINVASIVE E.COLI: NOT DETECTED
VIBRIO PARAHAEMOLYTICUS: NOT DETECTED
VIBRIO SPP.: NOT DETECTED
YERSINIA ENTEROCOLITICA: NOT DETECTED

## 2025-02-26 ENCOUNTER — OFFICE VISIT (OUTPATIENT)
Dept: URBAN - METROPOLITAN AREA CLINIC 12 | Facility: CLINIC | Age: 45
End: 2025-02-26

## 2025-02-26 ENCOUNTER — LAB OUTSIDE AN ENCOUNTER (OUTPATIENT)
Dept: URBAN - METROPOLITAN AREA CLINIC 12 | Facility: CLINIC | Age: 45
End: 2025-02-26

## 2025-02-26 VITALS
HEART RATE: 86 BPM | SYSTOLIC BLOOD PRESSURE: 97 MMHG | WEIGHT: 117 LBS | BODY MASS INDEX: 20.73 KG/M2 | TEMPERATURE: 98.1 F | DIASTOLIC BLOOD PRESSURE: 66 MMHG | HEIGHT: 63 IN

## 2025-02-26 RX ORDER — SODIUM, POTASSIUM,MAG SULFATES 17.5-3.13G
375 MILLILITERS SOLUTION, RECONSTITUTED, ORAL ORAL AS DIRECTED
Qty: 1 | Refills: 0 | OUTPATIENT
Start: 2025-02-26

## 2025-02-26 RX ORDER — LANSOPRAZOLE 30 MG/1
1 TABLET TABLET, ORALLY DISINTEGRATING, DELAYED RELEASE ORAL ONCE A DAY
Qty: 90 | Refills: 1

## 2025-02-26 RX ORDER — ONDANSETRON HYDROCHLORIDE 4 MG/1
1 TABLET TABLET, FILM COATED ORAL
Qty: 3 | Refills: 0 | OUTPATIENT
Start: 2025-02-26

## 2025-02-26 RX ORDER — LANSOPRAZOLE 30 MG/1
1 TABLET TABLET, ORALLY DISINTEGRATING, DELAYED RELEASE ORAL ONCE A DAY
Qty: 90 | Refills: 1 | Status: ACTIVE | COMMUNITY

## 2025-02-26 RX ORDER — DICYCLOMINE HYDROCHLORIDE 20 MG/1
1 TABLET TABLET ORAL THREE TIMES A DAY
Qty: 90 | Refills: 3 | Status: ON HOLD | COMMUNITY
Start: 2025-01-03 | End: 2025-05-03

## 2025-02-26 NOTE — PREVIOUS WORKUP REVIEWED EXTERNAL MEDICAL RECORD
. ENDOSCOPIES egd 10/2023- normal endoscopy, no polyp seen.  biopsies with no h pylori. duodenal biopsies normal 2022- colonoscopy- TA  and benign tissue 2022- egd- HP polyp removed, gastritis with ? h pylori 2021- egd- no poylps, negative h pylori 2020- egd hyperplastic polyp   LABS  GPP 1/25/2025 negative for c diff   IMAGES CT SCAN  1/23/2025  aneurysm. No calcified cholelithiasis. No intestinal obstruction or free air. No  overt bowel wall thickening or dominant colonic mass. Imaging of pelvis shows no  evidence for ascites. No dominant mass involving uterus. Small cystic focus left  adnexa measures 2.2         x 1.7 cm and 5 Hounsfield units most in keeping with dominant follicle as  expected given patient's young age. Urinary bladder shows no wall thickening or  dominant mass. Normal appendix not visualized. No inflammation around the cecum. No  oral contrast opacification of the sigmoid colon or rectum which is suboptimal.  Borderline wall thickening of the rectosigmoid junction and rectum. No perirectal  stranding. Bone windows show no suspicious lesions.              IMPRESSION:         1.    Borderline wall thickening of rectosigmoid junction and rectum -  nonspecific and could be due to underdistended state, consider mild nonspecific  colitis/proctitis possible. Correlate clinically here.         2.    No urinary or intestinal obstruction.

## 2025-03-14 ENCOUNTER — TELEPHONE ENCOUNTER (OUTPATIENT)
Dept: URBAN - METROPOLITAN AREA CLINIC 12 | Facility: CLINIC | Age: 45
End: 2025-03-14

## 2025-03-14 RX ORDER — RIFAXIMIN 550 MG/1
1 TABLET TABLET ORAL THREE TIMES A DAY
Qty: 42 TABLET | Refills: 0 | OUTPATIENT
Start: 2025-03-14 | End: 2025-03-28

## 2025-03-17 ENCOUNTER — TELEPHONE ENCOUNTER (OUTPATIENT)
Dept: URBAN - METROPOLITAN AREA CLINIC 23 | Facility: CLINIC | Age: 45
End: 2025-03-17

## 2025-04-23 ENCOUNTER — WEB ENCOUNTER (OUTPATIENT)
Dept: URBAN - METROPOLITAN AREA CLINIC 12 | Facility: CLINIC | Age: 45
End: 2025-04-23

## 2025-04-30 ENCOUNTER — OFFICE VISIT (OUTPATIENT)
Dept: URBAN - METROPOLITAN AREA CLINIC 12 | Facility: CLINIC | Age: 45
End: 2025-04-30

## 2025-04-30 RX ORDER — LANSOPRAZOLE 30 MG/1
1 TABLET TABLET, ORALLY DISINTEGRATING, DELAYED RELEASE ORAL ONCE A DAY
Qty: 90 | Refills: 1 | Status: ACTIVE | COMMUNITY

## 2025-04-30 RX ORDER — ONDANSETRON HYDROCHLORIDE 4 MG/1
1 TABLET TABLET, FILM COATED ORAL
Qty: 3 | Refills: 0 | Status: ACTIVE | COMMUNITY
Start: 2025-02-26

## 2025-04-30 RX ORDER — SODIUM, POTASSIUM,MAG SULFATES 17.5-3.13G
375 MILLILITERS SOLUTION, RECONSTITUTED, ORAL ORAL AS DIRECTED
Qty: 1 | Refills: 0 | Status: ACTIVE | COMMUNITY
Start: 2025-02-26

## 2025-04-30 NOTE — HPI-TODAY'S VISIT:
She was last seen in the office by Dr. Banegas on 2/26/2025 for changes in bowel habits, alternating between diarrhea and constipation and abdominal pain, and cramping.  Her CT from 1/23/2025 showed borderline wall thickening of rectosigmoid junction and rectum.  SIBO positive. Colonoscopy is scheduled for 5/9/2025.  fasting-> improved symptoms.   Past 4 days. diarrhea after meal, increased abd pain after meal. not related to certain foods. Bentyl initially helped but stopped working.   Xifaxan-  no CRC.  hx of SSA.   toast-causes diarrhea but less abd pain.   weight stable.   heartburn, esophagus burning.  Lansoprazole 30mg prn.  Pepcid everyday once a day.

## 2025-05-01 PROBLEM — 247330004: Status: ACTIVE | Noted: 2025-05-01

## 2025-05-01 PROBLEM — 62315008: Status: ACTIVE | Noted: 2025-05-01

## 2025-05-01 PROBLEM — 70861000119106: Status: ACTIVE | Noted: 2025-05-01

## 2025-05-09 ENCOUNTER — OFFICE VISIT (OUTPATIENT)
Dept: URBAN - METROPOLITAN AREA LAB 3 | Facility: LAB | Age: 45
End: 2025-05-09
Payer: COMMERCIAL

## 2025-05-09 DIAGNOSIS — Z86.0101 H/O ADENOMATOUS POLYP OF COLON: ICD-10-CM

## 2025-05-09 DIAGNOSIS — K52.832 LYMPHOCYTIC COLITIS: ICD-10-CM

## 2025-05-09 PROCEDURE — 45380 COLONOSCOPY AND BIOPSY: CPT | Performed by: INTERNAL MEDICINE

## 2025-05-09 RX ORDER — LANSOPRAZOLE 30 MG/1
1 TABLET TABLET, ORALLY DISINTEGRATING, DELAYED RELEASE ORAL ONCE A DAY
Qty: 90 | Refills: 1 | Status: ACTIVE | COMMUNITY

## 2025-05-09 RX ORDER — SODIUM, POTASSIUM,MAG SULFATES 17.5-3.13G
375 MILLILITERS SOLUTION, RECONSTITUTED, ORAL ORAL AS DIRECTED
Qty: 1 | Refills: 0 | Status: ACTIVE | COMMUNITY
Start: 2025-02-26

## 2025-05-09 RX ORDER — ONDANSETRON HYDROCHLORIDE 4 MG/1
1 TABLET TABLET, FILM COATED ORAL
Qty: 3 | Refills: 0 | Status: ACTIVE | COMMUNITY
Start: 2025-02-26

## 2025-05-29 ENCOUNTER — OFFICE VISIT (OUTPATIENT)
Dept: URBAN - METROPOLITAN AREA CLINIC 12 | Facility: CLINIC | Age: 45
End: 2025-05-29
Payer: COMMERCIAL

## 2025-05-29 DIAGNOSIS — Z87.19 HISTORY OF GASTROESOPHAGEAL REFLUX (GERD): ICD-10-CM

## 2025-05-29 DIAGNOSIS — K58.0 IRRITABLE BOWEL SYNDROME WITH DIARRHEA: ICD-10-CM

## 2025-05-29 DIAGNOSIS — K52.832 LYMPHOCYTIC COLITIS: ICD-10-CM

## 2025-05-29 DIAGNOSIS — R10.9 ABDOMINAL CRAMPING: ICD-10-CM

## 2025-05-29 DIAGNOSIS — K63.8219 SMALL INTESTINAL BACTERIAL OVERGROWTH (SIBO): ICD-10-CM

## 2025-05-29 DIAGNOSIS — R19.7 FREQUENT DIARRHEA: ICD-10-CM

## 2025-05-29 PROBLEM — 446081009: Status: ACTIVE | Noted: 2025-05-29

## 2025-05-29 PROBLEM — 1187071008: Status: ACTIVE | Noted: 2025-05-29

## 2025-05-29 PROCEDURE — 99214 OFFICE O/P EST MOD 30 MIN: CPT

## 2025-05-29 RX ORDER — LANSOPRAZOLE 30 MG/1
1 TABLET TABLET, ORALLY DISINTEGRATING, DELAYED RELEASE ORAL ONCE A DAY
Qty: 90 | Refills: 1 | Status: ACTIVE | COMMUNITY

## 2025-05-29 RX ORDER — SODIUM, POTASSIUM,MAG SULFATES 17.5-3.13G
375 MILLILITERS SOLUTION, RECONSTITUTED, ORAL ORAL AS DIRECTED
Qty: 1 | Refills: 0 | Status: ACTIVE | COMMUNITY
Start: 2025-02-26

## 2025-05-29 RX ORDER — CHOLESTYRAMINE 4 G/9G
1 PACKET MIXED WITH WATER OR NON-CARBONATED DRINK POWDER, FOR SUSPENSION ORAL TWICE A DAY
Qty: 60 | Refills: 1 | OUTPATIENT
Start: 2025-05-29

## 2025-05-29 RX ORDER — ONDANSETRON HYDROCHLORIDE 4 MG/1
1 TABLET TABLET, FILM COATED ORAL
Qty: 3 | Refills: 0 | Status: ACTIVE | COMMUNITY
Start: 2025-02-26

## 2025-05-29 RX ORDER — RIFAXIMIN 550 MG/1
1 TABLET TABLET ORAL THREE TIMES A DAY
Qty: 42 TABLET | Refills: 0 | OUTPATIENT
Start: 2025-05-29 | End: 2025-06-12

## 2025-05-29 NOTE — HPI-TODAY'S VISIT:
Patient is a 45-year-old female presents today for colonoscopy follow-up.  She was seen by me on 4/30/25 for postprandial diarrhea, and abdominal cramping.    Colon 5/9/2025 with Dr. Banegas showed a scattered erythematous mucosa in the ascending colon.  Normal TI.  Path showed lymphocytic colitis.  Repeat in 5 years advised.  She is having 2-3 soft stools per day, which she does not consider to be diarrhea. There is no abdominal cramping or pain. Denies NSAIDs or SSRI use but takes Lansoprazole 30mg prn. She reports that she previously tried budesonide for sinus issues, but experienced severe mood swings and had to discontinue the medication after a few days.  She states that she is currently under a high level of stress due to marital issues, which has led to a decreased appetite and reduced food intake. Her GERD symptoms have worsened over the past few weeks, likely due to stress. She has been taking lansoprazole 30 mg as needed and Pepcid daily. She also reports intermittent nausea. The patient remains hopeful that her situation will improve soon. Denies any new concerns or symptoms today.   Last OV note from 4/30/25 Patient is a 45-year-old female presents today for postprandial diarrhea and abdominal cramping. She was last seen in the office by Dr. Banegas on 2/26/2025 for changes in bowel habits, alternating between diarrhea and constipation and abdominal pain, and cramping. Her CT from 1/23/2025 showed borderline wall thickening of rectosigmoid junction and rectum.  She reports that her symptoms initially improved while fasting during Ramadan. However, symptoms recurred 4 days ago upon resuming regular meals. She has been experiencing postprandial diarrhea with associated increased abdominal pain. Symptoms do not appear to be food-specific. She previously tried Bentyl, which initially helped, but is no longer effective. She tested positive for SIBO but has not yet tried it. Her weight has remained stable.  She reports intermittent heartburn and an esophageal burning sensation. She is currently taking lansoprazole 30 mg as needed and Pepcid daily, though she reports minimal improvement with these medications. She has a colonoscopy scheduled with Dr. Banegas on 5/9/2025.

## 2025-05-30 ENCOUNTER — TELEPHONE ENCOUNTER (OUTPATIENT)
Dept: URBAN - METROPOLITAN AREA CLINIC 23 | Facility: CLINIC | Age: 45
End: 2025-05-30